# Patient Record
Sex: FEMALE | Race: WHITE
[De-identification: names, ages, dates, MRNs, and addresses within clinical notes are randomized per-mention and may not be internally consistent; named-entity substitution may affect disease eponyms.]

---

## 2018-07-12 ENCOUNTER — HOSPITAL ENCOUNTER (OUTPATIENT)
Dept: HOSPITAL 41 - JD.SDS | Age: 38
Discharge: HOME | End: 2018-07-12
Attending: ORTHOPAEDIC SURGERY
Payer: COMMERCIAL

## 2018-07-12 DIAGNOSIS — G56.12: Primary | ICD-10-CM

## 2018-07-12 DIAGNOSIS — Z88.0: ICD-10-CM

## 2018-07-12 DIAGNOSIS — Z88.2: ICD-10-CM

## 2018-07-12 DIAGNOSIS — Z87.891: ICD-10-CM

## 2018-07-12 NOTE — OR
DATE OF OPERATION:  07/12/2018

 

SURGEON:  Hany Garcia MD

 

OPERATION PERFORMED:  Left carpal tunnel release.

 

PREOPERATIVE DIAGNOSIS:

Left median nerve compression neuropathy.

 

POSTOPERATIVE DIAGNOSIS:

Left median nerve compression neuropathy.

 

ANESTHESIA:

Local only.

 

ANESTHESIOLOGIST:

None.

 

ASSISTANT:

Renetta Marquez PA-C.

 

ESTIMATED BLOOD LOSS:

Less than 5 mL.

 

COMPLICATIONS:

None.

 

CONDITION:

Stable.

 

DESCRIPTION OF PROCEDURE:

The patient was identified in the preoperative holding area.  Proper site was

marked and identified by the surgeon.  The patient was taken back to the

operating theater after adequate anesthesia, the patient's left upper extremity

was sterilely prepped and draped in the usual sterile fashion.  Esmarch was then

used as a tourniquet on the forearm.  1% lidocaine without epinephrine and 0.25%

Marcaine without epinephrine used to anesthetize palmar cutaneous branch of the

median nerve as well as the incisional site using Arora's cardinal line and

ulnar border of the 4th digit.  Incision was then made, blunt dissection was

taken down at the palmar cutaneous fascia.  Palmar cutaneous fascia was then

incised with a Routt blade.  Transverse carpal ligament was identified.  A

small rent was made in the transverse carpal ligament.  Then, using a tenotomy

scissors under direct visualization, a takedown of the transverse carpal

ligament was done all the way distally, stopping short of palmar arch, it was

found to be adequately released.  Attention was turned proximally at this time.

The superficial forearm fascia as well as transverse carpal ligament was incised

using a tenotomy scissors making sure to keep the tips ulnar to protect the

palmar cutaneous branch of median nerve.  At this time, it was released both

proximally and distally.  Adequate saline was irrigated through the wound.  4-0

nylon simple suture was used for closure of the skin.  The patient had a sterile

soft dressing applied and was sent to PACU in stable condition.

 

DD:  07/12/2018 22:11:06

DT:  07/12/2018 22:41:36  MMODAL

Job #:  [id number]

## 2018-07-12 NOTE — PCM.OPNOTE
- General Post-Op/Procedure Note


Date of Surgery/Procedure: 07/12/18


Operative Procedure(s): left carpal tunnel release


Pre Op Diagnosis: left median nerve compression neuropathy


Post-Op Diagnosis: Same


Anesthesia Technique: Local


Primary Surgeon: Hany Garcia


Assistant: Renetta Marquez in mLs: 5


Complications: None


Condition: Good